# Patient Record
Sex: FEMALE | Race: WHITE | NOT HISPANIC OR LATINO | URBAN - METROPOLITAN AREA
[De-identification: names, ages, dates, MRNs, and addresses within clinical notes are randomized per-mention and may not be internally consistent; named-entity substitution may affect disease eponyms.]

---

## 2023-07-24 ENCOUNTER — OFFICE VISIT (OUTPATIENT)
Dept: OBSTETRICS AND GYNECOLOGY | Facility: CLINIC | Age: 68
End: 2023-07-24
Payer: COMMERCIAL

## 2023-07-24 VITALS
SYSTOLIC BLOOD PRESSURE: 128 MMHG | DIASTOLIC BLOOD PRESSURE: 74 MMHG | WEIGHT: 161 LBS | BODY MASS INDEX: 26.82 KG/M2 | HEIGHT: 65 IN

## 2023-07-24 DIAGNOSIS — Z01.419 VISIT FOR GYNECOLOGIC EXAMINATION: Primary | ICD-10-CM

## 2023-07-24 DIAGNOSIS — R32 URINARY INCONTINENCE, UNSPECIFIED TYPE: ICD-10-CM

## 2023-07-24 DIAGNOSIS — Z79.890 HORMONE REPLACEMENT THERAPY: ICD-10-CM

## 2023-07-24 LAB
BILIRUB BLD-MCNC: NEGATIVE MG/DL
EXPIRATION DATE: ABNORMAL
GLUCOSE UR STRIP-MCNC: NEGATIVE MG/DL
KETONES UR QL: NEGATIVE
LEUKOCYTE EST, POC: NEGATIVE
Lab: ABNORMAL
NITRITE UR-MCNC: NEGATIVE MG/ML
PH UR: 6 [PH] (ref 5–8)
PROT UR STRIP-MCNC: ABNORMAL MG/DL
RBC # UR STRIP: NEGATIVE /UL
SP GR UR: 1.02 (ref 1–1.03)
UROBILINOGEN UR QL: ABNORMAL

## 2023-07-24 PROCEDURE — 99386 PREV VISIT NEW AGE 40-64: CPT | Performed by: OBSTETRICS & GYNECOLOGY

## 2023-07-24 PROCEDURE — 81003 URINALYSIS AUTO W/O SCOPE: CPT | Performed by: OBSTETRICS & GYNECOLOGY

## 2023-07-24 RX ORDER — NAPROXEN 500 MG/1
TABLET ORAL
COMMUNITY

## 2023-07-24 RX ORDER — BROMPHENIRAMINE MALEATE, PSEUDOEPHEDRINE HYDROCHLORIDE, AND DEXTROMETHORPHAN HYDROBROMIDE 2; 30; 10 MG/5ML; MG/5ML; MG/5ML
SYRUP ORAL
COMMUNITY
Start: 2023-07-20

## 2023-07-24 RX ORDER — FLUTICASONE PROPIONATE 50 MCG
SPRAY, SUSPENSION (ML) NASAL
COMMUNITY
Start: 2023-04-13

## 2023-07-24 RX ORDER — DOXYCYCLINE 100 MG/1
1 TABLET ORAL EVERY 12 HOURS SCHEDULED
COMMUNITY
Start: 2023-07-20

## 2023-07-24 RX ORDER — IRBESARTAN 300 MG/1
TABLET ORAL
COMMUNITY

## 2023-07-24 RX ORDER — METFORMIN HYDROCHLORIDE 500 MG/1
TABLET, EXTENDED RELEASE ORAL
COMMUNITY
Start: 2023-04-13

## 2023-07-24 RX ORDER — POTASSIUM CHLORIDE 750 MG/1
TABLET, EXTENDED RELEASE ORAL
COMMUNITY

## 2023-07-24 RX ORDER — VENLAFAXINE HYDROCHLORIDE 150 MG/1
CAPSULE, EXTENDED RELEASE ORAL
COMMUNITY

## 2023-07-24 RX ORDER — DEXTROAMPHETAMINE SACCHARATE, AMPHETAMINE ASPARTATE, DEXTROAMPHETAMINE SULFATE AND AMPHETAMINE SULFATE 7.5; 7.5; 7.5; 7.5 MG/1; MG/1; MG/1; MG/1
TABLET ORAL
COMMUNITY

## 2023-07-24 RX ORDER — AMLODIPINE BESYLATE 5 MG/1
TABLET ORAL
COMMUNITY

## 2023-07-24 RX ORDER — DEXTROAMPHETAMINE SACCHARATE, AMPHETAMINE ASPARTATE, DEXTROAMPHETAMINE SULFATE AND AMPHETAMINE SULFATE 5; 5; 5; 5 MG/1; MG/1; MG/1; MG/1
TABLET ORAL
COMMUNITY
Start: 2023-07-17

## 2023-07-24 RX ORDER — SEMAGLUTIDE 1.34 MG/ML
INJECTION, SOLUTION SUBCUTANEOUS
COMMUNITY

## 2023-07-24 RX ORDER — MONTELUKAST SODIUM 10 MG/1
TABLET ORAL
COMMUNITY
Start: 2023-05-12

## 2023-07-24 RX ORDER — ATORVASTATIN CALCIUM 80 MG/1
TABLET, FILM COATED ORAL
COMMUNITY

## 2023-07-24 RX ORDER — CONJUGATED ESTROGENS 0.3 MG/1
TABLET, FILM COATED ORAL
COMMUNITY

## 2023-07-24 NOTE — PROGRESS NOTES
[facility]  [address], [address]  [place], [place] [address]  Phone: [phone] / Fax:  [phone]      07/24/2023    Hien HERNÁNDEZ [facility] [address]    Lanny Griffith PA    Chief Complaint   Patient presents with    Gynecologic Exam     NP Annual exam. No pap -Hyst 20+ years ago for DUB. Mammogram within the year, patient states this was normal. Colonoscopy every 5 years due to Family History-Mother. She is scheduled for August 25th. Patient is on HRT-Premarin .3mg for hot flashes, she and her PCP wanted to make sure she was ok to continue taking, she has been on HRT for 3 years. She also requested a u/a sample, she is c/o a slight burning and leaking. Cc u/a is negative.         Lindsey Dahl is here for annual gynecologic exam.  HPI - Patient had hysterectomy nearly 20 years ago for bleeding issues/endometriosis by Dr Morena Eric; she reports that ovaries were removed as well.  She reports significant unremitting hot flashes since her hysterectomy.  She had tried various hormonal medications without good results until finally being placed on Premarin 0.3 mg 3 years ago.  Symptoms are markedly improved and her PCP wanted her to obtain advice on continuing.  She had a normal mammogram September 2022 and is getting a colonoscopy next month.  She has some leakage of urine but worse recently with coughing.    Past Medical History:   Diagnosis Date    Environmental allergies     Hyperlipidemia     Hypertension        Past Surgical History:   Procedure Laterality Date    APPENDECTOMY      CARPAL TUNNEL RELEASE      CHOLECYSTECTOMY      DENTAL PROCEDURE      HYSTERECTOMY         Allergies   Allergen Reactions    Cyclosporine Swelling    Penicillins Other (See Comments)     As a Child. Mother & Brother both allergic       Social History     Socioeconomic History    Marital status:    Tobacco Use    Smoking status: Former   Vaping Use    Vaping Use: Never used   Substance and  "Sexual Activity    Alcohol use: No    Drug use: No    Sexual activity: Yes     Partners: Male     Birth control/protection: Hysterectomy, Tubal ligation       Family History   Problem Relation Age of Onset    No Known Problems Father     Colon cancer Mother     Heart failure Mother     Diabetes Mother        No LMP recorded. Patient has had a hysterectomy.    OB History          2    Para        Term                AB        Living             SAB        IAB        Ectopic        Molar        Multiple        Live Births   2                Vitals:    23 1105   BP: 128/74   Weight: 73 kg (161 lb)   Height: 165.1 cm (65\")       Physical Exam  Constitutional:       Appearance: Normal appearance. She is well-developed.   Genitourinary:      Urethral meatus normal.      Right Labia: No tenderness or lesions.     Left Labia: No tenderness or lesions.     Vaginal cuff intact.     No vaginal discharge.        Right Adnexa: absent.     Left Adnexa: absent.     Cervix is absent.      Uterus is absent.      No urethral tenderness present.   Breasts:     Right: No mass or nipple discharge.      Left: No mass or nipple discharge.   HENT:      Right Ear: External ear normal.      Left Ear: External ear normal.      Nose: Nose normal.   Eyes:      Conjunctiva/sclera: Conjunctivae normal.   Neck:      Thyroid: No thyromegaly.   Cardiovascular:      Rate and Rhythm: Normal rate and regular rhythm.      Heart sounds: Normal heart sounds.   Pulmonary:      Effort: Pulmonary effort is normal.      Breath sounds: No stridor. No wheezing.   Abdominal:      Palpations: Abdomen is soft.      Tenderness: There is no abdominal tenderness. There is no guarding or rebound.   Musculoskeletal:         General: Normal range of motion.      Cervical back: Normal range of motion and neck supple.   Neurological:      Mental Status: She is alert.      Coordination: Coordination normal.   Skin:     General: Skin is warm and dry. "   Psychiatric:         Mood and Affect: Mood normal.         Behavior: Behavior normal.         Thought Content: Thought content normal.         Judgment: Judgment normal.   Vitals reviewed. Exam conducted with a chaperone present.       Diagnoses and all orders for this visit:    1. Visit for gynecologic examination (Primary)        -      Discussed importance of regular screening and breast awareness.  2. Urinary incontinence, unspecified type  -     Urine Culture - Urine, Urine, Clean Catch  -     Await culture.  Symptoms overall mild and worse currently because of coughing.  3. Hormone replacement therapy        -     Patient was counseled on estrogen use.  Estrogen improves vasomotor symptoms/reduces risks of osteoporosis; however, risks of heart disease, thrombosis, stroke and breast disease are increased slightly.  She has hyperlipidemia, diabetes and hypertension but these are well controlled.  She is on the lowest possible dose of premarin.  She understands clearly that she has some elevation in her risk but this is modest because of good control of her medical problems.  She should continue yearly mammography.  Otherwise, it is reasonable to continue estrogen.      Praful Paniagua MD

## 2023-07-26 ENCOUNTER — PATIENT ROUNDING (BHMG ONLY) (OUTPATIENT)
Dept: OBSTETRICS AND GYNECOLOGY | Facility: CLINIC | Age: 68
End: 2023-07-26
Payer: COMMERCIAL

## 2023-07-26 ENCOUNTER — TELEPHONE (OUTPATIENT)
Dept: OBSTETRICS AND GYNECOLOGY | Facility: CLINIC | Age: 68
End: 2023-07-26
Payer: COMMERCIAL

## 2023-07-26 ENCOUNTER — PATIENT MESSAGE (OUTPATIENT)
Dept: OBSTETRICS AND GYNECOLOGY | Facility: CLINIC | Age: 68
End: 2023-07-26
Payer: COMMERCIAL

## 2023-07-26 LAB
BACTERIA UR CULT: NORMAL
BACTERIA UR CULT: NORMAL

## 2023-07-26 NOTE — TELEPHONE ENCOUNTER
Left message for Anjali that Dr Paniagua said your urine culture was negative for infection. Thank you

## 2023-07-26 NOTE — PROGRESS NOTES
My chart message has been sent to the patient for PATIENT ROUNDING with [facility].

## 2024-03-22 ENCOUNTER — TELEPHONE (OUTPATIENT)
Dept: OBSTETRICS AND GYNECOLOGY | Facility: CLINIC | Age: 69
End: 2024-03-22
Payer: COMMERCIAL

## 2024-03-22 NOTE — TELEPHONE ENCOUNTER
Pt states  PCP has advised her to start getting Premarin refills through gyno    Per  records - Premarin 0.3 MG tablet Take 1 tablet (0.3 mg total) by mouth every other day     Pt would like to know if this can please be sent to pharmacy.     Please advise, thank you!    Pharmacy updated - [facility] - [place], [place] - [address] - [phone]  - [phone] FX

## 2024-03-29 NOTE — TELEPHONE ENCOUNTER
Dr. Paniagua,  states pharmacy does not have any order. Can you please resend premarin? Thank you!    Pharmacy - [facility] - [place], [place] - [address] [place] [address] 1 - [phone]  - [phone] FX

## 2024-04-01 RX ORDER — CONJUGATED ESTROGENS 0.3 MG/1
0.3 TABLET, FILM COATED ORAL DAILY
Qty: 30 TABLET | Refills: 1 | Status: SHIPPED | OUTPATIENT
Start: 2024-04-01 | End: 2024-06-30

## 2024-04-30 RX ORDER — CONJUGATED ESTROGENS 0.3 MG/1
0.3 TABLET, FILM COATED ORAL DAILY
Qty: 90 TABLET | Refills: 0 | Status: SHIPPED | OUTPATIENT
Start: 2024-04-30 | End: 2024-07-29

## 2024-05-23 RX ORDER — CONJUGATED ESTROGENS 0.3 MG/1
0.3 TABLET, FILM COATED ORAL DAILY
Qty: 90 TABLET | Refills: 0 | Status: SHIPPED | OUTPATIENT
Start: 2024-05-23 | End: 2024-08-21

## 2024-06-12 ENCOUNTER — TELEPHONE (OUTPATIENT)
Dept: OBSTETRICS AND GYNECOLOGY | Facility: CLINIC | Age: 69
End: 2024-06-12
Payer: COMMERCIAL

## 2024-06-12 NOTE — TELEPHONE ENCOUNTER
Clare    Let her know I called in one refill.    Per our records, she has not had a mammogram since September 2022.  Please have her make a visit to see us and to schedule her mammogram for the location that she typically does these.    Thanks    Siva

## 2024-06-12 NOTE — TELEPHONE ENCOUNTER
Pt is requesting Premarin refill please.     Premarin 0.3 MG tablet [9536]     Pharmacy confirmed -   [facility] - [address] - [phone] [facility] [phone] FX        Please advise, thank you

## 2024-06-21 RX ORDER — CONJUGATED ESTROGENS 0.3 MG/1
0.3 TABLET, FILM COATED ORAL DAILY
Qty: 90 TABLET | Refills: 0 | OUTPATIENT
Start: 2024-06-21 | End: 2024-09-19

## 2024-06-21 NOTE — TELEPHONE ENCOUNTER
Caller: Lindsey Dahl    Relationship: Self    Best call back number: [phone]    Requested Prescriptions:   Requested Prescriptions     Pending Prescriptions Disp Refills    Premarin 0.3 MG tablet 90 tablet 0     Sig: Take 1 tablet by mouth Daily for 90 days. Take daily for 21 days then do not take for 7 days.        Pharmacy where request should be sent:      Last office visit with prescribing clinician: 7/24/2023   Last telemedicine visit with prescribing clinician: Visit date not found   Next office visit with prescribing clinician: 8/1/2024     Additional details provided by patient: PT WILL NEED REFILL SOON, NEXT AVAIL APPT IS 08/01, PLEASE FILL    Does the patient have less than a 3 day supply:  [] Yes  [x] No    Would you like a call back once the refill request has been completed: [x] Yes [] No    If the office needs to give you a call back, can they leave a voicemail: [x] Yes [] No    Inocencia Dacosta   06/21/24 13:14 EDT         DELETE AFTER READING TO PATIENT: “Thank you for sharing this information with me. I will send a message to the clinical team. Please allow 48 hours for the clinical staff to follow up on this request.”

## 2024-06-24 RX ORDER — CONJUGATED ESTROGENS 0.3 MG/1
0.3 TABLET, FILM COATED ORAL DAILY
Qty: 90 TABLET | Refills: 0 | Status: SHIPPED | OUTPATIENT
Start: 2024-06-24 | End: 2024-09-22

## 2024-08-01 ENCOUNTER — OFFICE VISIT (OUTPATIENT)
Dept: OBSTETRICS AND GYNECOLOGY | Facility: CLINIC | Age: 69
End: 2024-08-01
Payer: COMMERCIAL

## 2024-08-01 VITALS
SYSTOLIC BLOOD PRESSURE: 125 MMHG | BODY MASS INDEX: 28.66 KG/M2 | DIASTOLIC BLOOD PRESSURE: 72 MMHG | WEIGHT: 172 LBS | HEIGHT: 65 IN

## 2024-08-01 DIAGNOSIS — N39.3 SUI (STRESS URINARY INCONTINENCE, FEMALE): ICD-10-CM

## 2024-08-01 DIAGNOSIS — Z01.419 VISIT FOR GYNECOLOGIC EXAMINATION: Primary | ICD-10-CM

## 2024-08-01 DIAGNOSIS — Z12.11 COLON CANCER SCREENING: ICD-10-CM

## 2024-08-01 LAB
BILIRUB BLD-MCNC: NEGATIVE MG/DL
DEVELOPER EXPIRATION DATE: NORMAL
DEVELOPER LOT NUMBER: NORMAL
EXPIRATION DATE: NORMAL
EXPIRATION DATE: NORMAL
FECAL OCCULT BLOOD SCREEN, POC: NEGATIVE
GLUCOSE UR STRIP-MCNC: NEGATIVE MG/DL
KETONES UR QL: NEGATIVE
LEUKOCYTE EST, POC: NEGATIVE
Lab: NORMAL
Lab: NORMAL
NEGATIVE CONTROL: NEGATIVE
NITRITE UR-MCNC: NEGATIVE MG/ML
PH UR: 7 [PH] (ref 5–8)
POSITIVE CONTROL: POSITIVE
PROT UR STRIP-MCNC: NEGATIVE MG/DL
RBC # UR STRIP: NEGATIVE /UL
SP GR UR: 1.02 (ref 1–1.03)
UROBILINOGEN UR QL: NORMAL

## 2024-08-01 RX ORDER — SEMAGLUTIDE 2.68 MG/ML
INJECTION, SOLUTION SUBCUTANEOUS
COMMUNITY

## 2024-08-01 RX ORDER — MINOCYCLINE HYDROCHLORIDE 100 MG/1
1 CAPSULE ORAL DAILY
COMMUNITY
Start: 2024-07-15

## 2024-08-01 RX ORDER — MELOXICAM 7.5 MG/1
TABLET ORAL
COMMUNITY

## 2024-08-01 NOTE — PROGRESS NOTES
[facility]  [address], [address]  [place], [place] [address]  Phone: [phone] / Fax:  [phone]      08/01/2024    Hien HERNÁNDEZ [facility] [address]    Lanny Griffith PA    Chief Complaint   Patient presents with    Gynecologic Exam     Annual exam. No pap -Hyst 20+ years ago for DUB. Mammogram 9-12-23-NL.. Colonoscopy 8/2023 -NL per patient, every 5 years due to Family History-Mother.  DEXA 9-19-23-NL.. Patient c/o leaking urine, mainly with coughing; she wears a pad daily. Cc u/a  is negative.       Lindsey Dahl is here for annual gynecologic exam.  HPI - Patient with no pap history because of previous hysterectomy.  She had a normal mammogram 10 months ago and she is scheduled for follow up in the next 2 months.  Her last colonoscopy was normal one year ago.  Patient reports leakage of urine with coughing and sneezing.  She wears a pad daily.  Patient had a normal DEXA 1 year ago.    Past Medical History:   Diagnosis Date    Diabetes mellitus     Type II    Environmental allergies     Hyperlipidemia     Hypertension        Past Surgical History:   Procedure Laterality Date    APPENDECTOMY      CARPAL TUNNEL RELEASE Bilateral     CHOLECYSTECTOMY      DENTAL PROCEDURE      FINGER SURGERY Left     Middle Finger Bone Spurs    HAND SURGERY Left     Gangolin Cyst    HYSTERECTOMY      WRIST SURGERY Left     Gangolin cyst       Allergies   Allergen Reactions    Cyclosporine Swelling    Penicillins Other (See Comments)     As a Child. Mother & Brother both allergic       Social History     Socioeconomic History    Marital status:    Tobacco Use    Smoking status: Former   Vaping Use    Vaping status: Never Used   Substance and Sexual Activity    Alcohol use: No    Drug use: No    Sexual activity: Yes     Partners: Male     Birth control/protection: Hysterectomy, Tubal ligation       Family History   Problem Relation Age of Onset    Colon cancer Mother     Heart failure Mother   "   Diabetes Mother     Breast cancer Neg Hx        No LMP recorded. Patient has had a hysterectomy.    OB History          2    Para        Term                AB        Living             SAB        IAB        Ectopic        Molar        Multiple        Live Births   2                Vitals:    24 0919   BP: 125/72   Weight: 78 kg (172 lb)   Height: 165.1 cm (65\")       Physical Exam  Constitutional:       Appearance: Normal appearance. She is well-developed.   Genitourinary:      Bladder, rectum and urethral meatus normal.      Right Labia: No tenderness or lesions.     Left Labia: No tenderness or lesions.     No vaginal discharge or tenderness.        Right Adnexa: not tender and not full.     Left Adnexa: not tender and not full.     No cervical motion tenderness or lesion.      Uterus is not enlarged or tender.      No urethral tenderness or hypermobility present.   Rectum:      No rectal mass or tenderness.   Breasts:     Right: No mass or nipple discharge.      Left: No mass or nipple discharge.   HENT:      Right Ear: External ear normal.      Left Ear: External ear normal.      Nose: Nose normal.   Eyes:      Conjunctiva/sclera: Conjunctivae normal.   Neck:      Thyroid: No thyromegaly.   Cardiovascular:      Rate and Rhythm: Normal rate and regular rhythm.      Heart sounds: Normal heart sounds.   Pulmonary:      Effort: Pulmonary effort is normal.      Breath sounds: No stridor. No wheezing.   Abdominal:      Palpations: Abdomen is soft.      Tenderness: There is no abdominal tenderness. There is no guarding or rebound.   Musculoskeletal:         General: Normal range of motion.      Cervical back: Normal range of motion and neck supple.   Neurological:      Mental Status: She is alert.      Coordination: Coordination normal.   Skin:     General: Skin is warm and dry.   Psychiatric:         Mood and Affect: Mood normal.         Behavior: Behavior normal.         Thought Content: " Thought content normal.         Judgment: Judgment normal.   Vitals reviewed. Exam conducted with a chaperone present.         Diagnoses and all orders for this visit:    1. Visit for gynecologic examination (Primary)        -     Discussed importance of regular screening and breast awareness.        -     Patient with ongoing vasomotor symptoms.  Increase Premarin to 0.45mg.  2. ALEX (stress urinary incontinence, female)  -     Urine Culture - Urine, Urine, Clean Catch  -     Offered physical therapy versus urogynecology consult.  Patient declines and will use pads for now.  Symptoms are overall mild.  3. Colon cancer screening        -     FOBT negative.      Praful Paniagua MD

## 2024-08-03 LAB
BACTERIA UR CULT: NO GROWTH
BACTERIA UR CULT: NORMAL

## 2024-08-05 ENCOUNTER — TELEPHONE (OUTPATIENT)
Dept: OBSTETRICS AND GYNECOLOGY | Facility: CLINIC | Age: 69
End: 2024-08-05
Payer: COMMERCIAL

## 2025-08-12 ENCOUNTER — TELEPHONE (OUTPATIENT)
Dept: OBSTETRICS AND GYNECOLOGY | Facility: CLINIC | Age: 70
End: 2025-08-12
Payer: COMMERCIAL